# Patient Record
Sex: MALE | Race: WHITE | NOT HISPANIC OR LATINO
[De-identification: names, ages, dates, MRNs, and addresses within clinical notes are randomized per-mention and may not be internally consistent; named-entity substitution may affect disease eponyms.]

---

## 2023-02-10 PROBLEM — Z00.00 ENCOUNTER FOR PREVENTIVE HEALTH EXAMINATION: Status: ACTIVE | Noted: 2023-02-10

## 2023-02-16 ENCOUNTER — APPOINTMENT (OUTPATIENT)
Dept: ORTHOPEDIC SURGERY | Facility: CLINIC | Age: 53
End: 2023-02-16
Payer: COMMERCIAL

## 2023-02-16 PROCEDURE — 99203 OFFICE O/P NEW LOW 30 MIN: CPT

## 2023-02-16 NOTE — HISTORY OF PRESENT ILLNESS
[de-identified] : First-time visit for this right-hand-dominant 53-year-old male he is here with complaint of six 4 to 6-month history of difficulty with overhead activities of the right shoulder difficulty with internal rotation such as putting on a jacket.  He has pain at night the pain seems to radiate down to the mid arm..  He recalls no specific accident injury or traumatic initiating event.

## 2023-02-16 NOTE — REASON FOR VISIT
[Initial Visit] : an initial visit for [Other: ____] : [unfilled] [FreeTextEntry2] : Rt shoulder pain. no injury/trama. He states he had the pain for a while.

## 2023-02-16 NOTE — DISCUSSION/SUMMARY
[de-identified] : Patient I discussed at length the underlying etiology of his right shoulder pain.  He has clinical exam highly consistent with rotator cuff tendinopathy.  We reviewed the pertinent anatomy showed the patient how a tear of the rotator cuff tendon can produce symptoms similar to his complaints today.  Due to the fact that this is more than 4 months discomfort and dysfunction patient be sent for an MRI to help evaluate both size and location of the rotator cuff tear of the right shoulder.  We also placed on Celebrex 200 mg p.o. twice daily for 4-day course then to be taken thereafter as needed for pain.  Reasonable risk and benefits of medication were discussed in detail including potential side effects.  Reviewed current medication profile appears to be no relative contraindication to its limited intermittent current use.  Patient will notify us once his MRIs been completed so we can review the results and form a treatment plan.\par \par Today's consultation lasted 40 minutes.

## 2023-02-17 RX ORDER — CELECOXIB 200 MG/1
200 CAPSULE ORAL
Qty: 30 | Refills: 3 | Status: ACTIVE | COMMUNITY
Start: 2023-02-17 | End: 1900-01-01

## 2023-04-20 ENCOUNTER — APPOINTMENT (OUTPATIENT)
Dept: ORTHOPEDIC SURGERY | Facility: CLINIC | Age: 53
End: 2023-04-20
Payer: COMMERCIAL

## 2023-04-20 DIAGNOSIS — M67.911 UNSPECIFIED DISORDER OF SYNOVIUM AND TENDON, RIGHT SHOULDER: ICD-10-CM

## 2023-04-20 PROCEDURE — 20611 DRAIN/INJ JOINT/BURSA W/US: CPT | Mod: RT

## 2023-04-20 PROCEDURE — 99213 OFFICE O/P EST LOW 20 MIN: CPT | Mod: 25

## 2023-04-20 NOTE — PHYSICAL EXAM
[de-identified] : Right shoulder positive mild terminal impingement some weakness with forward elevation and abduction with resistance.  He is neurovascular intact distally.

## 2023-04-20 NOTE — HISTORY OF PRESENT ILLNESS
[de-identified] : Patient returns today with continued right shoulder pain.  Not taking previously prescribed Celebrex although he did fill the prescription he did the MRI of the right shoulder the results are available for review patient has extensive tearing of the supraspinatus tendon.

## 2023-04-20 NOTE — DISCUSSION/SUMMARY
[de-identified] : Patient I discussed at length the underlying etiology of his right shoulder pain.  He has a partial tear advanced of the right rotator cuff tendon.  This was diagnosed by the recent MRI.  Patient underwent a right subacromial injection today.  He will monitor symptoms follow-up next week if not thoroughly improved at that point a second injection may be warranted beyond that if patient has persistent pain he was referred to our shoulder specialist for evaluation for possible arthroscopic surgery.\par \par Today's consultation lasted 25 minutes

## 2023-04-20 NOTE — PROCEDURE
[de-identified] : LIDOCAINE\par HIKMA FARMACEUTICA\par NDC 6007-2606-50\par LOT # [id number]\par EXP 10/2023\par 1% 500MG/50ML\par \par KENALOG-10\par [employer]\par NDC 1220-1387-36\par LOT #[id number]\par EXP oct 2023\par 50MG/5ML\par \par \par Patient given a sterile subacromial injection via posterior approach into the right shoulder.  Patient tolerated the injection well.

## 2023-05-04 ENCOUNTER — APPOINTMENT (OUTPATIENT)
Dept: ORTHOPEDIC SURGERY | Facility: CLINIC | Age: 53
End: 2023-05-04